# Patient Record
Sex: FEMALE | Race: WHITE
[De-identification: names, ages, dates, MRNs, and addresses within clinical notes are randomized per-mention and may not be internally consistent; named-entity substitution may affect disease eponyms.]

---

## 2018-05-07 ENCOUNTER — HOSPITAL ENCOUNTER (OUTPATIENT)
Dept: HOSPITAL 57 - BURERS | Age: 73
Setting detail: OBSERVATION
LOS: 1 days | Discharge: HOME | End: 2018-05-08
Payer: MEDICARE

## 2018-05-07 VITALS — BODY MASS INDEX: 18.8 KG/M2

## 2018-05-07 DIAGNOSIS — T63.061A: Primary | ICD-10-CM

## 2018-05-07 DIAGNOSIS — Z79.899: ICD-10-CM

## 2018-05-07 DIAGNOSIS — R03.0: ICD-10-CM

## 2018-05-07 DIAGNOSIS — Z91.041: ICD-10-CM

## 2018-05-07 DIAGNOSIS — S60.474A: ICD-10-CM

## 2018-05-07 LAB
ALBUMIN SERPL BCG-MCNC: 4.5 G/DL (ref 3.4–4.8)
ALP SERPL-CCNC: 68 U/L (ref 40–150)
ALT SERPL W P-5'-P-CCNC: 39 U/L (ref 8–55)
ANION GAP SERPL CALC-SCNC: 17 MMOL/L (ref 10–20)
APTT PPP: 26.3 SEC (ref 22.9–36.1)
AST SERPL-CCNC: 38 U/L (ref 5–34)
BASOPHILS # BLD AUTO: 0.1 THOU/UL (ref 0–0.2)
BASOPHILS # BLD AUTO: 0.1 THOU/UL (ref 0–0.2)
BASOPHILS NFR BLD AUTO: 1.1 % (ref 0–1)
BASOPHILS NFR BLD AUTO: 1.2 % (ref 0–1)
BILIRUB SERPL-MCNC: 0.8 MG/DL (ref 0.2–1.2)
BUN SERPL-MCNC: 16 MG/DL (ref 9.8–20.1)
CALCIUM SERPL-MCNC: 10.1 MG/DL (ref 7.8–10.44)
CHLORIDE SERPL-SCNC: 104 MMOL/L (ref 98–107)
CO2 SERPL-SCNC: 25 MMOL/L (ref 23–31)
CREAT CL PREDICTED SERPL C-G-VRATE: 0 ML/MIN (ref 70–130)
EOSINOPHIL # BLD AUTO: 0.1 THOU/UL (ref 0–0.7)
EOSINOPHIL # BLD AUTO: 0.2 THOU/UL (ref 0–0.7)
EOSINOPHIL NFR BLD AUTO: 1.1 % (ref 0–10)
EOSINOPHIL NFR BLD AUTO: 2.1 % (ref 0–10)
FIBRINOGEN PPP-MCNC: 361 MG/DL (ref 253–463)
GLOBULIN SER CALC-MCNC: 3.4 G/DL (ref 2.4–3.5)
GLUCOSE SERPL-MCNC: 111 MG/DL (ref 83–110)
HGB BLD-MCNC: 12.6 G/DL (ref 12–16)
HGB BLD-MCNC: 13.9 G/DL (ref 12–16)
INR PPP: 0.9
LYMPHOCYTES # BLD AUTO: 2.7 THOU/UL (ref 1.2–3.4)
LYMPHOCYTES # BLD AUTO: 3.3 THOU/UL (ref 1.2–3.4)
LYMPHOCYTES NFR BLD AUTO: 28.2 % (ref 21–51)
LYMPHOCYTES NFR BLD AUTO: 40.6 % (ref 21–51)
MCH RBC QN AUTO: 30.6 PG (ref 27–31)
MCH RBC QN AUTO: 31.5 PG (ref 27–31)
MCV RBC AUTO: 88.5 FL (ref 81–99)
MCV RBC AUTO: 88.9 FL (ref 81–99)
MONOCYTES # BLD AUTO: 0.7 THOU/UL (ref 0.11–0.59)
MONOCYTES # BLD AUTO: 0.9 THOU/UL (ref 0.11–0.59)
MONOCYTES NFR BLD AUTO: 8.4 % (ref 0–10)
MONOCYTES NFR BLD AUTO: 9.9 % (ref 0–10)
NEUTROPHILS # BLD AUTO: 3.9 THOU/UL (ref 1.4–6.5)
NEUTROPHILS # BLD AUTO: 5.6 THOU/UL (ref 1.4–6.5)
NEUTROPHILS NFR BLD AUTO: 47.7 % (ref 42–75)
NEUTROPHILS NFR BLD AUTO: 59.5 % (ref 42–75)
PLATELET # BLD AUTO: 220 THOU/UL (ref 130–400)
PLATELET # BLD AUTO: 242 THOU/UL (ref 130–400)
POTASSIUM SERPL-SCNC: 4 MMOL/L (ref 3.5–5.1)
PROTHROMBIN TIME: 12.5 SEC (ref 12–14.7)
RBC # BLD AUTO: 4.11 MILL/UL (ref 4.2–5.4)
RBC # BLD AUTO: 4.41 MILL/UL (ref 4.2–5.4)
SODIUM SERPL-SCNC: 142 MMOL/L (ref 136–145)
WBC # BLD AUTO: 8.2 THOU/UL (ref 4.8–10.8)
WBC # BLD AUTO: 9.4 THOU/UL (ref 4.8–10.8)

## 2018-05-07 PROCEDURE — 90471 IMMUNIZATION ADMIN: CPT

## 2018-05-07 PROCEDURE — 36415 COLL VENOUS BLD VENIPUNCTURE: CPT

## 2018-05-07 PROCEDURE — 96374 THER/PROPH/DIAG INJ IV PUSH: CPT

## 2018-05-07 PROCEDURE — 94760 N-INVAS EAR/PLS OXIMETRY 1: CPT

## 2018-05-07 PROCEDURE — 85730 THROMBOPLASTIN TIME PARTIAL: CPT

## 2018-05-07 PROCEDURE — 85610 PROTHROMBIN TIME: CPT

## 2018-05-07 PROCEDURE — 90715 TDAP VACCINE 7 YRS/> IM: CPT

## 2018-05-07 PROCEDURE — G0378 HOSPITAL OBSERVATION PER HR: HCPCS

## 2018-05-07 PROCEDURE — 80053 COMPREHEN METABOLIC PANEL: CPT

## 2018-05-07 PROCEDURE — 85025 COMPLETE CBC W/AUTO DIFF WBC: CPT

## 2018-05-07 PROCEDURE — 85384 FIBRINOGEN ACTIVITY: CPT

## 2018-05-08 VITALS — DIASTOLIC BLOOD PRESSURE: 76 MMHG | TEMPERATURE: 96.3 F | SYSTOLIC BLOOD PRESSURE: 150 MMHG

## 2018-05-08 LAB
ALBUMIN SERPL BCG-MCNC: 3.8 G/DL (ref 3.4–4.8)
ALP SERPL-CCNC: 56 U/L (ref 40–150)
ALT SERPL W P-5'-P-CCNC: 30 U/L (ref 8–55)
ANION GAP SERPL CALC-SCNC: 13 MMOL/L (ref 10–20)
APTT PPP: 27.8 SEC (ref 22.9–36.1)
APTT PPP: 27.9 SEC (ref 22.9–36.1)
AST SERPL-CCNC: 29 U/L (ref 5–34)
BASOPHILS # BLD AUTO: 0.1 THOU/UL (ref 0–0.2)
BASOPHILS NFR BLD AUTO: 1.3 % (ref 0–1)
BILIRUB SERPL-MCNC: 0.6 MG/DL (ref 0.2–1.2)
BUN SERPL-MCNC: 14 MG/DL (ref 9.8–20.1)
CALCIUM SERPL-MCNC: 9.3 MG/DL (ref 7.8–10.44)
CHLORIDE SERPL-SCNC: 106 MMOL/L (ref 98–107)
CO2 SERPL-SCNC: 26 MMOL/L (ref 23–31)
CREAT CL PREDICTED SERPL C-G-VRATE: 52 ML/MIN (ref 70–130)
EOSINOPHIL # BLD AUTO: 0.2 THOU/UL (ref 0–0.7)
EOSINOPHIL NFR BLD AUTO: 2.6 % (ref 0–10)
FIBRINOGEN PPP-MCNC: 296 MG/DL (ref 253–463)
GLOBULIN SER CALC-MCNC: 2.8 G/DL (ref 2.4–3.5)
GLUCOSE SERPL-MCNC: 104 MG/DL (ref 83–110)
HGB BLD-MCNC: 13.9 G/DL (ref 12–16)
INR PPP: 1
INR PPP: 1
LYMPHOCYTES # BLD AUTO: 2.6 THOU/UL (ref 1.2–3.4)
LYMPHOCYTES NFR BLD AUTO: 35.3 % (ref 21–51)
MCH RBC QN AUTO: 30.2 PG (ref 27–31)
MCV RBC AUTO: 89.3 FL (ref 81–99)
MONOCYTES # BLD AUTO: 0.8 THOU/UL (ref 0.11–0.59)
MONOCYTES NFR BLD AUTO: 11.1 % (ref 0–10)
NEUTROPHILS # BLD AUTO: 3.7 THOU/UL (ref 1.4–6.5)
NEUTROPHILS NFR BLD AUTO: 49.7 % (ref 42–75)
PLATELET # BLD AUTO: 234 THOU/UL (ref 130–400)
POTASSIUM SERPL-SCNC: 3.9 MMOL/L (ref 3.5–5.1)
PROTHROMBIN TIME: 13 SEC (ref 12–14.7)
PROTHROMBIN TIME: 13.1 SEC (ref 12–14.7)
RBC # BLD AUTO: 4.58 MILL/UL (ref 4.2–5.4)
SODIUM SERPL-SCNC: 141 MMOL/L (ref 136–145)
WBC # BLD AUTO: 7.5 THOU/UL (ref 4.8–10.8)

## 2018-05-08 RX ADMIN — MULTIPLE VITAMINS W/ MINERALS TAB SCH TAB: TAB at 08:06

## 2018-05-09 NOTE — SS
DATE OF ADMISSION:  05/07/2018

 

DATE OF DISCHARGE:  05/08/2018

 

PRIMARY CARE PHYSICIAN:  Out of town.

 

ADMITTING PHYSICIAN:  Dr. My Santana

 

CHIEF COMPLAINT:  Snake bite.

 

HISTORY OF PRESENT ILLNESS:  Ms. Victor is a pleasant 72-year-old female with 
no major chronic illness from [facility]  presented to the ED complaining of 
snake bite to right proximal fourth finger.  The bite was close to the PIP 
joint ulnar aspect.  She complained of pain as constant, dull and aching with a 
pain scale of 6/10.  She has mild localized swelling and tenderness.  No 
associated erythema or significant bruising.  The patient released the 
tourniquet applied to the finger prior to evaluation by the ER physician.  
Initial vital signs showed a blood pressure of 153/95, pulse of 112, heart rate 
of 18, temperature was 98.4.  During observation at the ED, the swelling has 
progressed gradually about 16 cm from the bite towards her wrist.  She also 
complained of mild tenderness.  However no blister, no ecchymosis or bleb 
formation noted.  Last night at around 9:00 p.m. after her transfer to the room 
the swelling has reached a distal part of her wrist.  Bite appeared dry in 
nature.  No systemic signs of hypertension, bleeding, vomiting.  Her labs were 
unremarkable.  Repeat labs at 11:30 showed WBC of 9.4, hemoglobin of 12, 
hematocrit of 36, neutrophils of 59.  PT of 13, INR of 1.  Fibrinogen of 296.  
This morning, the swelling has progressed slightly distal to the wrist.  The 
mid forearm is tender; however, with no swelling.  It is warm to touch.  Pain 
is about 6/10 due to tightness on her right entire hand.  Radial pulse is 
strong.  The patient admitted taking her Advil last night while at the ED.  She 
did not ask for any pain relief the entire night.  The patient requested for 
release this morning and made us aware that she could manage her condition at 
home.

 

PAST MEDICAL HISTORY:  Borderline hypertension.

 

PAST SURGICAL HISTORY:  Unremarkable.

 

FAMILY HISTORY:  No history of early coronary artery disease  or any form of 
cancers.  

 

MEDICATIONS:  Advil for joint pains.

 

ALLERGIES:  No known drug allergies.

 

PERSONAL/SOCIAL HISTORY:  No alcohol or tobacco use.  She lives with her 
 at [facility].  She is here visiting her 98-year-old mother.

 

REVIEW OF SYSTEMS:

GENERAL:  No fever, no chills, no nausea, vomiting.

HEENT:  No headaches, no eye pain or eye discharge.  No sore throat.

CARDIOVASCULAR:  No chest pain, no palpitations.

RESPIRATORY:  No cough, no shortness of breath.

GASTROINTESTINAL:  No nausea, vomiting or diarrhea.

GENITOURINARY:  No dysuria, no hematuria.

MUSCULOSKELETAL:  Positive for intermittent joint pain secondary to arthritis.  
Positive for pain on the right hand.

NEUROLOGIC:  Denies any focal weakness, headaches, paresthesias.

ENDOCRINE:  Negative for polyuria or polydipsia.  

HEMATOLOGY:  Negative for abnormal bleeding or anemia.

 

PHYSICAL EXAMINATION:

VITAL SIGNS:  Blood pressure of 134/75, temperature of 97.9, pulse 68, O2 sat 
97 at room air, respiratory rate of 18.

GENERAL:  The patient is alert, oriented, not in respiratory distress, thin 
appearing elderly female.

HEENT:  Normocephalic, atraumatic.  Pupils equally reactive to light.

NECK:  Supple.  Negative for lymphadenopathy.

CHEST AND LUNGS:  Symmetrical expansion.  Clear to auscultation.

HEART:  Regular rate and rhythm.  Negative for murmur.

RESPIRATORY:  Symmetrical.  No retraction.  Clear to auscultation bilaterally.

ABDOMEN:  Flat, soft, nontender, normoactive bowel sounds.

EXTREMITIES:  Left upper extremity unremarkable.  Right hand showed moderate 
swelling diffusely affecting all fingers distal to the wrist, positive for a 
puncture wound with mild erythema and ecchymosis on the 4th PIP, ulnar aspect.  
Decreased range of motion of the fingers and hand due to swelling.  Slight 
tenderness noted on the proximal aspect of the forearm.

PSYCHIATRIC:  Appropriate affect and demeanor.

NEUROLOGIC:  Nonfocal.  No deficits.

 

ASSESSMENT:  Snake bite with mild envenomation.  The patient was admitted for 
observation.  Her initial, repeat labs x2 were all normal.  Her vital signs 
were stable.  The swelling has progressed over a period of 12 hours affecting 
her wrist; however, it has not affected the forearm.  She has no compression 
syndrome.  She has good radial pulse.  No systemic signs.  The patient 
requested for discharge this morning.  She was advised to keep her right arm 
elevated at all times.  She is advised to avoid all NSAIDs for the next few 
days.  Expect improvement of swelling and resolution of the pain in a few days.
  She will need to follow up with her PCP in 5-7 days.  Recommended that if 
there is any worsening pain, swelling, signs of infection, she will need to 
follow up at the emergency room for further evaluation and treatment.  The 
patient verbalized understanding.  

 

MEDICATIONS:  Norco 5/325 one tablet every 6 hours p.r.n. for pain or Tylenol 
650 mg 1 tablet q.4-6h. p.r.n. for pain.  

 

DIET:  The patient will resume a regular diet.

 

MTDD